# Patient Record
Sex: MALE | Race: WHITE | NOT HISPANIC OR LATINO | Employment: OTHER | URBAN - METROPOLITAN AREA
[De-identification: names, ages, dates, MRNs, and addresses within clinical notes are randomized per-mention and may not be internally consistent; named-entity substitution may affect disease eponyms.]

---

## 2024-02-06 ENCOUNTER — OFFICE VISIT (OUTPATIENT)
Dept: PULMONOLOGY | Facility: CLINIC | Age: 74
End: 2024-02-06
Payer: COMMERCIAL

## 2024-02-06 VITALS
SYSTOLIC BLOOD PRESSURE: 149 MMHG | DIASTOLIC BLOOD PRESSURE: 83 MMHG | HEART RATE: 72 BPM | TEMPERATURE: 97.2 F | RESPIRATION RATE: 16 BRPM | BODY MASS INDEX: 32.23 KG/M2 | OXYGEN SATURATION: 98 % | WEIGHT: 224.6 LBS

## 2024-02-06 DIAGNOSIS — G47.33 OBSTRUCTIVE SLEEP APNEA SYNDROME: ICD-10-CM

## 2024-02-06 DIAGNOSIS — R06.02 SOB (SHORTNESS OF BREATH): Primary | ICD-10-CM

## 2024-02-06 PROBLEM — R73.9 ELEVATED BLOOD SUGAR: Status: ACTIVE | Noted: 2024-02-06

## 2024-02-06 PROBLEM — M75.52 SUBACROMIAL BURSITIS OF LEFT SHOULDER JOINT: Status: ACTIVE | Noted: 2024-02-06

## 2024-02-06 PROBLEM — R53.83 FATIGUE: Status: ACTIVE | Noted: 2024-02-06

## 2024-02-06 PROBLEM — K59.09 CHRONIC CONSTIPATION: Status: ACTIVE | Noted: 2024-02-06

## 2024-02-06 PROBLEM — M25.472 LEFT ANKLE SWELLING: Status: ACTIVE | Noted: 2024-02-06

## 2024-02-06 PROBLEM — C68.9 UROTHELIAL CARCINOMA (MULTI): Status: ACTIVE | Noted: 2024-02-06

## 2024-02-06 PROBLEM — F41.9 ANXIETY DISORDER: Status: ACTIVE | Noted: 2023-06-28

## 2024-02-06 PROBLEM — M10.9 GOUT: Status: ACTIVE | Noted: 2023-06-28

## 2024-02-06 PROBLEM — I10 HYPERTENSIVE DISORDER: Status: ACTIVE | Noted: 2024-02-06

## 2024-02-06 PROBLEM — R07.89 CHEST PAIN, ATYPICAL: Status: ACTIVE | Noted: 2024-02-06

## 2024-02-06 PROBLEM — M1A.09X0 IDIOPATHIC CHRONIC GOUT OF MULTIPLE SITES WITHOUT TOPHUS: Status: ACTIVE | Noted: 2022-10-04

## 2024-02-06 PROBLEM — R63.5 WEIGHT GAIN: Status: ACTIVE | Noted: 2024-02-06

## 2024-02-06 PROBLEM — G43.909 MIGRAINE HEADACHE: Status: ACTIVE | Noted: 2024-02-06

## 2024-02-06 PROBLEM — I10 BENIGN ESSENTIAL HYPERTENSION: Status: ACTIVE | Noted: 2024-02-06

## 2024-02-06 PROBLEM — D69.6 PLATELETS DECREASED (CMS-HCC): Status: ACTIVE | Noted: 2023-12-05

## 2024-02-06 PROBLEM — R74.8 ABNORMAL LIVER ENZYMES: Status: ACTIVE | Noted: 2024-02-06

## 2024-02-06 PROBLEM — G62.9 NEUROPATHY: Status: ACTIVE | Noted: 2024-02-06

## 2024-02-06 PROBLEM — E55.9 VITAMIN D DEFICIENCY, UNSPECIFIED: Status: ACTIVE | Noted: 2024-02-06

## 2024-02-06 PROBLEM — N40.0 BENIGN PROSTATIC HYPERPLASIA: Status: ACTIVE | Noted: 2024-02-06

## 2024-02-06 PROBLEM — E78.5 HYPERLIPIDEMIA: Status: ACTIVE | Noted: 2024-02-06

## 2024-02-06 PROBLEM — H16.223 KERATOCONJUNCTIVITIS SICCA OF BOTH EYES NOT SPECIFIED AS SJOGREN'S: Status: ACTIVE | Noted: 2020-02-03

## 2024-02-06 PROBLEM — N28.9 ABNORMAL KIDNEY FUNCTION: Status: ACTIVE | Noted: 2024-02-06

## 2024-02-06 PROBLEM — S43.82XA: Status: ACTIVE | Noted: 2024-02-06

## 2024-02-06 PROBLEM — I35.0 AORTIC STENOSIS: Status: ACTIVE | Noted: 2024-02-06

## 2024-02-06 PROBLEM — E83.52 HYPERCALCEMIA: Status: ACTIVE | Noted: 2024-02-06

## 2024-02-06 PROBLEM — N40.0 ENLARGED PROSTATE WITHOUT LOWER URINARY TRACT SYMPTOMS (LUTS): Status: ACTIVE | Noted: 2024-02-06

## 2024-02-06 PROBLEM — C64.9: Status: ACTIVE | Noted: 2024-02-06

## 2024-02-06 PROBLEM — M77.52 BURSITIS OF LEFT ANKLE: Status: ACTIVE | Noted: 2024-02-06

## 2024-02-06 PROBLEM — K92.2 LOWER GI BLEEDING: Status: ACTIVE | Noted: 2024-02-06

## 2024-02-06 PROBLEM — E66.9 OBESITY WITH BODY MASS INDEX 30 OR GREATER: Status: ACTIVE | Noted: 2024-02-06

## 2024-02-06 PROBLEM — R41.82 CHANGE IN MENTAL STATUS: Status: ACTIVE | Noted: 2024-02-06

## 2024-02-06 PROBLEM — M99.08 SOMATIC DYSFUNCTION OF RIB CAGE REGION: Status: ACTIVE | Noted: 2024-02-06

## 2024-02-06 PROBLEM — E78.5 DYSLIPIDEMIA: Status: ACTIVE | Noted: 2024-02-06

## 2024-02-06 PROBLEM — R10.10 PAIN OF UPPER ABDOMEN: Status: ACTIVE | Noted: 2024-02-06

## 2024-02-06 PROBLEM — R51.9 CHRONIC HEADACHES: Status: ACTIVE | Noted: 2024-02-06

## 2024-02-06 PROBLEM — G89.29 CHRONIC HEADACHES: Status: ACTIVE | Noted: 2024-02-06

## 2024-02-06 PROBLEM — M47.816 ARTHRITIS OF LUMBAR SPINE: Status: ACTIVE | Noted: 2024-02-06

## 2024-02-06 PROBLEM — K64.9 UNSPECIFIED HEMORRHOIDS: Status: ACTIVE | Noted: 2024-02-06

## 2024-02-06 PROBLEM — K21.9 GASTROESOPHAGEAL REFLUX DISEASE: Status: ACTIVE | Noted: 2024-02-06

## 2024-02-06 PROBLEM — J18.9 PNEUMONIA: Status: ACTIVE | Noted: 2024-02-06

## 2024-02-06 PROBLEM — R10.9 ABDOMINAL PAIN: Status: ACTIVE | Noted: 2024-02-06

## 2024-02-06 PROCEDURE — 1159F MED LIST DOCD IN RCRD: CPT | Performed by: INTERNAL MEDICINE

## 2024-02-06 PROCEDURE — 99205 OFFICE O/P NEW HI 60 MIN: CPT | Performed by: INTERNAL MEDICINE

## 2024-02-06 PROCEDURE — 1126F AMNT PAIN NOTED NONE PRSNT: CPT | Performed by: INTERNAL MEDICINE

## 2024-02-06 PROCEDURE — 3077F SYST BP >= 140 MM HG: CPT | Performed by: INTERNAL MEDICINE

## 2024-02-06 PROCEDURE — 1160F RVW MEDS BY RX/DR IN RCRD: CPT | Performed by: INTERNAL MEDICINE

## 2024-02-06 PROCEDURE — 3079F DIAST BP 80-89 MM HG: CPT | Performed by: INTERNAL MEDICINE

## 2024-02-06 PROCEDURE — 99215 OFFICE O/P EST HI 40 MIN: CPT | Performed by: INTERNAL MEDICINE

## 2024-02-06 RX ORDER — TAMSULOSIN HYDROCHLORIDE 0.4 MG/1
0.4 CAPSULE ORAL DAILY
COMMUNITY

## 2024-02-06 RX ORDER — ALLOPURINOL 300 MG/1
300 TABLET ORAL DAILY
COMMUNITY
Start: 2023-10-18

## 2024-02-06 RX ORDER — ASPIRIN 325 MG
50000 TABLET, DELAYED RELEASE (ENTERIC COATED) ORAL WEEKLY
COMMUNITY
Start: 2023-11-21 | End: 2024-02-19

## 2024-02-06 RX ORDER — ATENOLOL 50 MG/1
50 TABLET ORAL
COMMUNITY
Start: 2022-02-02 | End: 2024-03-04

## 2024-02-06 RX ORDER — LANSOPRAZOLE 30 MG/1
30 CAPSULE, DELAYED RELEASE ORAL
COMMUNITY

## 2024-02-06 RX ORDER — ALLOPURINOL 100 MG/1
100 TABLET ORAL
COMMUNITY
Start: 2023-12-05 | End: 2024-03-04

## 2024-02-06 RX ORDER — ICOSAPENT ETHYL 1 G/1
1 CAPSULE ORAL
COMMUNITY
Start: 2023-07-13

## 2024-02-06 RX ORDER — ALPRAZOLAM 0.5 MG/1
0.5 TABLET ORAL DAILY PRN
COMMUNITY

## 2024-02-06 RX ORDER — NAPROXEN SODIUM 220 MG/1
81 TABLET, FILM COATED ORAL
COMMUNITY
Start: 2023-03-03

## 2024-02-06 RX ORDER — AZITHROMYCIN 250 MG/1
TABLET, FILM COATED ORAL
COMMUNITY
Start: 2023-11-10

## 2024-02-06 RX ORDER — PANTOPRAZOLE SODIUM 40 MG/1
1 TABLET, DELAYED RELEASE ORAL
COMMUNITY
Start: 2023-03-03

## 2024-02-06 RX ORDER — BENZONATATE 200 MG/1
CAPSULE ORAL
COMMUNITY
Start: 2023-11-06

## 2024-02-06 RX ORDER — ASPIRIN 325 MG
50000 TABLET, DELAYED RELEASE (ENTERIC COATED) ORAL
COMMUNITY

## 2024-02-06 RX ORDER — ATORVASTATIN CALCIUM 40 MG/1
40 TABLET, FILM COATED ORAL
COMMUNITY
Start: 2023-12-05 | End: 2024-03-04

## 2024-02-06 RX ORDER — TOPIRAMATE 100 MG/1
2 TABLET, FILM COATED ORAL NIGHTLY
COMMUNITY
Start: 2019-09-06

## 2024-02-06 ASSESSMENT — ENCOUNTER SYMPTOMS
PALPITATIONS: 0
EYE DISCHARGE: 0
SLEEP DISTURBANCE: 0
HEMATURIA: 0
SINUS PRESSURE: 0
HEADACHES: 0
BRUISES/BLEEDS EASILY: 0
ABDOMINAL PAIN: 0
FACIAL SWELLING: 0
TREMORS: 0
WHEEZING: 0
APNEA: 0
ABDOMINAL DISTENTION: 0
UNEXPECTED WEIGHT CHANGE: 0
NUMBNESS: 0
JOINT SWELLING: 0
NAUSEA: 0
DIFFICULTY URINATING: 0
SHORTNESS OF BREATH: 1
EYE REDNESS: 0
RHINORRHEA: 0
SPEECH DIFFICULTY: 0
ARTHRALGIAS: 0
AGITATION: 0
DYSURIA: 0
WEAKNESS: 0
STRIDOR: 0
CHOKING: 0
FEVER: 1
FATIGUE: 0
SINUS PAIN: 0
NERVOUS/ANXIOUS: 0
LIGHT-HEADEDNESS: 0
CONSTIPATION: 0
ADENOPATHY: 0
DIZZINESS: 0
FREQUENCY: 0
COUGH: 0

## 2024-02-06 NOTE — PATIENT INSTRUCTIONS
I will order pulmonary function tests to check your breathing capacity including a 6-minute walk; this can be scheduled by calling     I also obtain a follow-up CT scan of the chest because of the diffuse pneumonia previously noted.  This can be scheduled by calling

## 2024-02-06 NOTE — PROGRESS NOTES
@PULMONARY CONSULTATION@         Reason for Consult: shortness of breath     ASSESSMENT:   The patient is a 73-year-old with ureteral/bladder cancer with hypertension, renal insufficiency, abnormal liver function test, the febrile illness over the last several months without definite COVID who has a long smoking history around 40 years stopping around 10 years ago.  He is complaining of some shortness of breath and had no evidence of a pulmonary embolism on his recent CT scan.  Potentially he has a degree of COPD with his long smoking history.  He has no heart history and further evaluation with pulmonary function tests needs to be performed.  He also should Follow-up with a repeat chest CT because of the diffuse pneumonia noted in November 2023.   Also his weight gain may be contributing somewhat to his shortness of breath.    PLAN:   I will order pulmonary function tests to check your breathing capacity including a 6-minute walk; this can be scheduled by calling     I also obtain a follow-up CT scan of the chest because of the diffuse pneumonia previously noted.  This can be scheduled by calling     In addition, he is following up with urology because of the abnormality noted on the right kidney at the time of the November CT scan.          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  He was said to have recently have had COVID.  He had low-grade fever and fatigue and was treated with a Z-Richie.  He was not treated with Paxlovid.  It should be noted that regarding the febrile illness his wife states that the temperature was quite high around 40 degrees for nearly a month.  He ended up going to various urgent cares and was seen in the  [facility] emergency room on 2023.  The CT scan revealed the followin.  No CT evidence of pulmonary embolism.     2.  Scattered infiltrates, most pronounced in the right upper lobe   suggestive of an infectious/inflammatory process.     3.  Right renal lesion that does not have the typical appearance of a   simple cyst.  Further evaluation with dedicated renal mass protocol CT is   recommended.     He apparently was treated with antibiotics as outlined above.  Since recovering from the febrile illness he has noted some shortness of breath.  He is also having some  pain nonspecifically in chest  and back that comes and goes.  It is not exertional.  He had no evidence of PE on the CT scan.  He states that previously he received some kind of injection and the pain got better.  He smoked probably around 40 years up until age 63.  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  He has no occupational environmental exposures.  He is [place]-speaking and the history was obtained through his wife.  No definite diagnosis of COVID was established as she reports.  Repeat COVID testing was negative.  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, hypertension, abnormal liver function tests with renal insufficiency, gout as     Current Outpatient Medications:     allopurinol (Zyloprim) 100 mg tablet, Take 1 tablet (100 mg) by mouth once daily., Disp: , Rfl:     aspirin 81 mg chewable tablet, Chew 1 tablet (81 mg) once daily., Disp: , Rfl:     atenolol (Tenormin) 50 mg tablet, Take 1 tablet (50 mg) by mouth once daily., Disp: , Rfl:     atorvastatin (Lipitor) 40 mg tablet, Take 1 tablet (40 mg) by mouth once daily., Disp: , Rfl:     cholecalciferol (Vitamin D-3) 50,000 unit capsule, Take 1 capsule (50,000 Units) by mouth once a week., Disp: , Rfl:     hydrocortisone acetate 2.5 % cream with perineal applicator,  Insert into the rectum., Disp: , Rfl:     lansoprazole (Prevacid) 30 mg DR capsule, Take 1 capsule (30 mg) by mouth once daily in the morning. Take before meals. 30 MINUTES BEFORE BREAKFAST, Disp: , Rfl:     tamsulosin (Flomax) 0.4 mg 24 hr capsule, Take 1 capsule (0.4 mg) by mouth once daily., Disp: , Rfl:     topiramate (Topamax) 100 mg tablet, Take 2 tablets (200 mg) by mouth once daily at bedtime., Disp: , Rfl:     allopurinol (Zyloprim) 300 mg tablet, Take 1 tablet (300 mg) by mouth once daily., Disp: , Rfl:     ALPRAZolam (Xanax) 0.5 mg tablet, Take 1 tablet (0.5 mg) by mouth once daily as needed., Disp: , Rfl:     azithromycin (Zithromax) 250 mg tablet, TAKE 2 TABLETS BY MOUTH TODAY, THEN TAKE 1 TABLET DAILY FOR 4 DAYS AS DIRECTED, Disp: , Rfl:     benzonatate (Tessalon) 200 mg capsule, TAKE 1 CAPSULE BY MOUTH 3 TIMES A DAY, FOR 10 DAYS., Disp: , Rfl:     cholecalciferol (Vitamin D-3) 50,000 unit capsule, Take 1 capsule (50,000 Units) by mouth 1 (one) time per week., Disp: , Rfl:     icosapent ethyL (Vascepa) 1 gram capsule, Take 1 capsule (1 g) by mouth once daily., Disp: , Rfl:     pantoprazole (ProtoNix) 40 mg EC tablet, Take 1 tablet (40 mg) by mouth once daily in the morning. Take before meals., Disp: , Rfl:      FAMILY HISTORY:   No COPD, TB, asthma etc  SOCIAL HISTORY:  He smoked around 40 years up until around 10 years ago a pack per day or more.  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    Review of Systems   Constitutional:  Positive for fever. Negative for fatigue and unexpected weight change.        Increase of 10 pounds of late   HENT:  Negative for congestion, facial swelling, nosebleeds, postnasal drip, rhinorrhea, sinus pressure and sinus pain.    Eyes:  Negative for discharge, redness and visual disturbance.   Respiratory:  Positive for shortness of breath. Negative for apnea, cough, choking, wheezing and stridor.    Cardiovascular:   Positive for chest pain. Negative for palpitations and leg swelling.   Gastrointestinal:  Negative for abdominal distention, abdominal pain, constipation and nausea.   Endocrine: Negative for cold intolerance and heat intolerance.   Genitourinary:  Negative for difficulty urinating, dysuria, frequency and hematuria.   Musculoskeletal:  Negative for arthralgias, gait problem and joint swelling.   Allergic/Immunologic: Negative for environmental allergies, food allergies and immunocompromised state.   Neurological:  Negative for dizziness, tremors, syncope, speech difficulty, weakness, light-headedness, numbness and headaches.   Hematological:  Negative for adenopathy. Does not bruise/bleed easily.   Psychiatric/Behavioral:  Negative for agitation, behavioral problems and sleep disturbance. The patient is not nervous/anxious.         Vitals:    02/06/24 1121   BP: 149/83   Pulse: 72   Resp: 16   Temp: 36.2 °C (97.2 °F)   SpO2: 98%        Physical Exam  Vitals reviewed.   Constitutional:       Appearance: Normal appearance.   HENT:      Head: Normocephalic and atraumatic.   Eyes:      Extraocular Movements: Extraocular movements intact.   Cardiovascular:      Rate and Rhythm: Normal rate and regular rhythm.      Heart sounds: No murmur heard.     No friction rub. No gallop.   Pulmonary:      Effort: Pulmonary effort is normal. No respiratory distress.      Breath sounds: Normal breath sounds. No stridor. No wheezing, rhonchi or rales.   Chest:      Chest wall: No tenderness.   Abdominal:      General: Abdomen is flat. There is no distension.      Palpations: Abdomen is soft. There is no mass.      Tenderness: There is no abdominal tenderness.   Musculoskeletal:         General: Normal range of motion.      Cervical back: Normal range of motion.      Right lower leg: No edema.      Left lower leg: No edema.   Skin:     General: Skin is warm and dry.   Neurological:      Mental Status: He is alert and oriented to person,  place, and time.   Psychiatric:         Mood and Affect: Mood normal.         Behavior: Behavior normal.

## 2024-02-06 NOTE — LETTER
Hardik Brennan is a 73 y.o. year old patient referred for pulmonary evaluation.  My summary is attached of his/her current complaints along with their  pertinent past medical history. Also, I have outlined my diagnostic assessment/plan. If you have any questions please feel free to contact me and as always, thank you for the referral.       Oliver Moeller MD, MPH

## 2024-02-23 ENCOUNTER — HOSPITAL ENCOUNTER (OUTPATIENT)
Dept: RADIOLOGY | Facility: HOSPITAL | Age: 74
Discharge: HOME | End: 2024-02-23
Payer: COMMERCIAL

## 2024-02-23 DIAGNOSIS — R06.02 SOB (SHORTNESS OF BREATH): ICD-10-CM

## 2024-02-23 DIAGNOSIS — G47.33 OBSTRUCTIVE SLEEP APNEA SYNDROME: ICD-10-CM

## 2024-02-23 PROCEDURE — 71250 CT THORAX DX C-: CPT | Performed by: STUDENT IN AN ORGANIZED HEALTH CARE EDUCATION/TRAINING PROGRAM

## 2024-02-23 PROCEDURE — 71250 CT THORAX DX C-: CPT

## 2024-03-06 ENCOUNTER — DOCUMENTATION (OUTPATIENT)
Dept: PULMONOLOGY | Facility: HOSPITAL | Age: 74
End: 2024-03-06
Payer: COMMERCIAL

## 2024-03-06 DIAGNOSIS — R91.8 GROUND GLASS OPACITY PRESENT ON IMAGING OF LUNG: Primary | ICD-10-CM

## 2024-03-06 NOTE — PROGRESS NOTES
CT scan reveals scattered nodularity with air trapping and some groundglass changes.  Will obtain a follow-up CT scan in 3 months for the groundglass changes.  Incidental findings are coronary calcifications and some calcifications of the aortic valve.  I spoke with his wife and they will make an appointment to see Dr. Cosme for that.

## 2024-03-08 ENCOUNTER — APPOINTMENT (OUTPATIENT)
Dept: RESPIRATORY THERAPY | Facility: HOSPITAL | Age: 74
End: 2024-03-08
Payer: COMMERCIAL

## 2024-03-08 ENCOUNTER — HOSPITAL ENCOUNTER (OUTPATIENT)
Dept: RESPIRATORY THERAPY | Facility: HOSPITAL | Age: 74
Discharge: HOME | End: 2024-03-08
Payer: COMMERCIAL

## 2024-03-08 DIAGNOSIS — G47.33 OBSTRUCTIVE SLEEP APNEA SYNDROME: ICD-10-CM

## 2024-03-08 DIAGNOSIS — R06.02 SOB (SHORTNESS OF BREATH): ICD-10-CM

## 2024-03-08 PROCEDURE — 94729 DIFFUSING CAPACITY: CPT | Performed by: INTERNAL MEDICINE

## 2024-03-08 PROCEDURE — 94726 PLETHYSMOGRAPHY LUNG VOLUMES: CPT | Performed by: INTERNAL MEDICINE

## 2024-03-08 PROCEDURE — 94618 PULMONARY STRESS TESTING: CPT | Performed by: INTERNAL MEDICINE

## 2024-03-08 PROCEDURE — 94726 PLETHYSMOGRAPHY LUNG VOLUMES: CPT

## 2024-03-08 PROCEDURE — 94060 EVALUATION OF WHEEZING: CPT | Performed by: INTERNAL MEDICINE

## 2024-03-11 ENCOUNTER — DOCUMENTATION (OUTPATIENT)
Dept: PULMONOLOGY | Facility: HOSPITAL | Age: 74
End: 2024-03-11
Payer: COMMERCIAL

## 2024-03-11 NOTE — PROGRESS NOTES
Pulmonary function test revealed only mild airflow obstruction with a mild decrease of saturation on the 6-minute walk.  Will continue to follow.

## 2024-06-06 ENCOUNTER — HOSPITAL ENCOUNTER (OUTPATIENT)
Dept: RADIOLOGY | Facility: HOSPITAL | Age: 74
Discharge: HOME | End: 2024-06-06
Payer: COMMERCIAL

## 2024-06-06 DIAGNOSIS — R91.8 GROUND GLASS OPACITY PRESENT ON IMAGING OF LUNG: ICD-10-CM

## 2024-06-06 PROCEDURE — 71250 CT THORAX DX C-: CPT | Performed by: RADIOLOGY

## 2024-06-06 PROCEDURE — 71250 CT THORAX DX C-: CPT

## 2025-01-24 ENCOUNTER — APPOINTMENT (OUTPATIENT)
Dept: UROLOGY | Facility: CLINIC | Age: 75
End: 2025-01-24
Payer: COMMERCIAL

## 2025-01-24 VITALS
HEIGHT: 73 IN | TEMPERATURE: 97.2 F | BODY MASS INDEX: 29.26 KG/M2 | DIASTOLIC BLOOD PRESSURE: 81 MMHG | HEART RATE: 76 BPM | SYSTOLIC BLOOD PRESSURE: 169 MMHG | WEIGHT: 220.8 LBS

## 2025-01-24 DIAGNOSIS — C67.8 MALIGNANT NEOPLASM OF OVERLAPPING SITES OF BLADDER (MULTI): Primary | ICD-10-CM

## 2025-01-24 DIAGNOSIS — C64.9 TRANSITIONAL CELL CARCINOMA OF KIDNEY, UNSPECIFIED LATERALITY (MULTI): ICD-10-CM

## 2025-01-24 LAB
POC APPEARANCE, URINE: CLEAR
POC BILIRUBIN, URINE: NEGATIVE
POC BLOOD, URINE: NEGATIVE
POC COLOR, URINE: YELLOW
POC GLUCOSE, URINE: NEGATIVE MG/DL
POC KETONES, URINE: NEGATIVE MG/DL
POC LEUKOCYTES, URINE: NEGATIVE
POC NITRITE,URINE: NEGATIVE
POC PH, URINE: 5.5 PH
POC PROTEIN, URINE: ABNORMAL MG/DL
POC SPECIFIC GRAVITY, URINE: 1.02
POC UROBILINOGEN, URINE: 0.2 EU/DL

## 2025-01-24 ASSESSMENT — PAIN SCALES - GENERAL: PAINLEVEL_OUTOF10: 0-NO PAIN

## 2025-01-24 NOTE — PROGRESS NOTES
Patient ID: Hardik Brennan is a 74 y.o. male.  He is here for follow-up.  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  He had a left nephro ureterectomy in October 2022.  He also has superficial bladder cancer.  His last recurrence was in June 2023.  His last cystoscopy was in June 2024, this was normal.      Cystoscopy    Date/Time: 1/24/2025 1:17 PM    Performed by: Tigist Sandoval MD  Authorized by: Tigist Sandoval MD    Procedure - Bladder Cystoscopy:     Procedure details: cystoscopy    Post-procedure:     Patient tolerance: Patient tolerated the procedure well with no immediate complications      Comments:      The patient was placed in a supine position.  His genitalia were prepped and draped.  We introduced viscous lidocaine per urethra.  A cystoscopy was passed per urethra, and we entered the bladder.  The right orifice was identified and appeared normal with clear efflux.  The left orifice is absent.  The bladder mucosa was inspected.  No tumors or stones seen.  Prostate is enlarged, mildly obstructive.  No strictures seen.            Plan:  Schedule cystoscopy w/ right retrograde in 4 months.

## 2025-02-26 ENCOUNTER — TELEPHONE (OUTPATIENT)
Dept: PRIMARY CARE | Facility: CLINIC | Age: 75
End: 2025-02-26
Payer: COMMERCIAL

## 2025-04-21 ENCOUNTER — PRE-ADMISSION TESTING (OUTPATIENT)
Dept: PREADMISSION TESTING | Facility: HOSPITAL | Age: 75
End: 2025-04-21
Payer: COMMERCIAL

## 2025-04-21 DIAGNOSIS — Z01.818 PREOP TESTING: Primary | ICD-10-CM

## 2025-04-21 DIAGNOSIS — C64.9 TRANSITIONAL CELL CARCINOMA OF KIDNEY, UNSPECIFIED LATERALITY: ICD-10-CM

## 2025-04-21 PROCEDURE — 99204 OFFICE O/P NEW MOD 45 MIN: CPT | Performed by: NURSE PRACTITIONER

## 2025-04-21 PROCEDURE — 93005 ELECTROCARDIOGRAM TRACING: CPT

## 2025-04-21 PROCEDURE — 93010 ELECTROCARDIOGRAM REPORT: CPT | Performed by: INTERNAL MEDICINE

## 2025-04-21 RX ORDER — LINAGLIPTIN 5 MG/1
5 TABLET, FILM COATED ORAL DAILY
COMMUNITY

## 2025-04-21 RX ORDER — BUDESONIDE AND FORMOTEROL FUMARATE DIHYDRATE 160; 4.5 UG/1; UG/1
2 AEROSOL RESPIRATORY (INHALATION)
COMMUNITY

## 2025-04-21 ASSESSMENT — DUKE ACTIVITY SCORE INDEX (DASI)
CAN YOU WALK A BLOCK OR TWO ON LEVEL GROUND: YES
CAN YOU DO HEAVY WORK AROUND THE HOUSE LIKE SCRUBBING FLOORS OR LIFTING AND MOVING HEAVY FURNITURE: NO
CAN YOU TAKE CARE OF YOURSELF (EAT, DRESS, BATHE, OR USE TOILET): YES
DASI METS SCORE: 6.3
TOTAL_SCORE: 28.7
CAN YOU PARTICIPATE IN STRENOUS SPORTS LIKE SWIMMING, SINGLES TENNIS, FOOTBALL, BASKETBALL, OR SKIING: NO
CAN YOU PARTICIPATE IN MODERATE RECREATIONAL ACTIVITIES LIKE GOLF, BOWLING, DANCING, DOUBLES TENNIS OR THROWING A BASEBALL OR FOOTBALL: NO
CAN YOU DO LIGHT WORK AROUND THE HOUSE LIKE DUSTING OR WASHING DISHES: YES
CAN YOU DO MODERATE WORK AROUND THE HOUSE LIKE VACUUMING, SWEEPING FLOORS OR CARRYING GROCERIES: YES
CAN YOU CLIMB A FLIGHT OF STAIRS OR WALK UP A HILL: YES
CAN YOU RUN A SHORT DISTANCE: NO
CAN YOU WALK INDOORS, SUCH AS AROUND YOUR HOUSE: YES
CAN YOU HAVE SEXUAL RELATIONS: YES
CAN YOU DO YARD WORK LIKE RAKING LEAVES, WEEDING OR PUSHING A MOWER: YES

## 2025-04-21 NOTE — CPM/PAT H&P
[facility]/PAT Evaluation       Name: Hardik Brennan (Hardik Brennan)  /Age: 1950/74 y.o.     In-Person       Chief Complaint: Malignant neoplasm of overlapping sites of bladder (Multi)     HPI  Patient is an alert and oriented 74 year old male scheduled for a  CYSTOSCOPY, WITH RETROGRADE PYELOGRAM on 25 with Dr. Sandoval for  Malignant neoplasm of overlapping sites of bladder (Multi). PMHX includes HTN, CKD4, bladder cancer, COPD. Presents to [facility] PAT today for perioperative risk stratification and optimization.     Medical History[1]    Surgical History[2]    Patient  has no history on file for sexual activity.    Family History[3]    Allergies[4]    Prior to Admission medications    Medication Sig Start Date End Date Taking? Authorizing Provider   allopurinol (Zyloprim) 300 mg tablet Take 1 tablet (300 mg) by mouth once daily. 10/18/23   Historical Provider, MD   ALPRAZolam (Xanax) 0.5 mg tablet Take 1 tablet (0.5 mg) by mouth once daily as needed.    Historical Provider, MD   aspirin 81 mg chewable tablet Chew 1 tablet (81 mg) once daily. 3/3/23   Historical Provider, MD   atenolol (Tenormin) 50 mg tablet Take 1 tablet (50 mg) by mouth once daily. 2/2/22 3/4/24  Historical Provider, MD   atorvastatin (Lipitor) 40 mg tablet Take 1 tablet (40 mg) by mouth once daily. 12/5/23 3/4/24  Historical Provider, MD   azithromycin (Zithromax) 250 mg tablet TAKE 2 TABLETS BY MOUTH TODAY, THEN TAKE 1 TABLET DAILY FOR 4 DAYS AS DIRECTED 11/10/23   Historical Provider, MD   benzonatate (Tessalon) 200 mg capsule TAKE 1 CAPSULE BY MOUTH 3 TIMES A DAY, FOR 10 DAYS. 23   Historical Provider, MD   cholecalciferol (Vitamin D-3) 50,000 unit capsule Take 1 capsule (50,000 Units) by mouth 1 (one) time per week.    Historical Provider, MD   hydrocortisone acetate 2.5 % cream with perineal applicator Insert into the rectum. 3/4/20   Historical Provider, MD   icosapent ethyL (Vascepa) 1 gram capsule Take 1 capsule (1 g) by mouth  once daily. 7/13/23   Historical Provider, MD   lansoprazole (Prevacid) 30 mg DR capsule Take 1 capsule (30 mg) by mouth once daily in the morning. Take before meals. 30 MINUTES BEFORE BREAKFAST    Historical Provider, MD   pantoprazole (ProtoNix) 40 mg EC tablet Take 1 tablet (40 mg) by mouth once daily in the morning. Take before meals. 3/3/23   Historical Provider, MD   tamsulosin (Flomax) 0.4 mg 24 hr capsule Take 1 capsule (0.4 mg) by mouth once daily.    Historical Provider, MD   topiramate (Topamax) 100 mg tablet Take 2 tablets (200 mg) by mouth once daily at bedtime. 9/6/19   Historical Provider, MD      Review of Systems   Constitutional: Negative for chills, decreased appetite, diaphoresis, fever and malaise/fatigue.   Eyes:  Negative for blurred vision and double vision.   Cardiovascular:  Negative for chest pain, claudication, cyanosis, dyspnea on exertion, irregular heartbeat, leg swelling, near-syncope and palpitations.   Respiratory:  Negative for cough, hemoptysis, shortness of breath and wheezing.    Endocrine: Negative for cold intolerance, heat intolerance, polydipsia, polyphagia and polyuria.   Gastrointestinal:  Negative for abdominal pain, constipation, diarrhea, dysphagia, nausea and vomiting.   Genitourinary:  Negative for bladder incontinence, dysuria, hematuria, incomplete emptying, nocturia, frequency, pelvic pain and urgency.   Neurological:  Negative for headaches, light-headedness, paresthesias, sensory change and weakness.   Psychiatric/Behavioral:  Negative for altered mental status.    Musculoskeletal: Negative for myalgias, arthralgias     Vitals and nursing note reviewed.     Physical exam  Constitutional:       Appearance: Normal appearance. He is Overweight.   HENT:      Head: Normocephalic and atraumatic.      Mouth/Throat:      Mouth: Mucous membranes are moist.      Pharynx: Oropharynx is clear.   Eyes:      Extraocular Movements: Extraocular movements intact.       Conjunctiva/sclera: Conjunctivae normal.      Pupils: Pupils are equal, round, and reactive to light.   Cardiovascular:      PMI at left midclavicular line. Normal rate. Regular rhythm. Normal S1. Normal S2.       Murmurs: There is no murmur.      No gallop.  No click. No rub.       No audible carotid bruit     No lower extremity edema on exam  Pulmonary:      Effort: Pulmonary effort is normal.      Breath sounds: Normal breath sounds.   Abdominal:      General: Abdomen is flat. Bowel sounds are normal.      Palpations: Abdomen is soft and non-tender  Musculoskeletal:      Cervical back: Normal range of motion and neck supple.   Skin:     General: Skin is warm and dry.      Capillary Refill: Capillary refill takes less than 2 seconds.   Neurological:      General: No focal deficit present.      Mental Status: He is alert and oriented to person, place, and time. Mental status is at baseline.   Psychiatric:         Mood and Affect: Mood normal.         Behavior: Behavior normal.         Thought Content: Thought content normal.         Judgment: Judgment normal.     Vitals and nursing note reviewed. Physical exam within normal limits.     DASI Risk Score      Flowsheet Row Pre-Admission Testing from 4/21/2025 in [facility]   Can you take care of yourself (eat, dress, bathe, or use toilet)?  2.75 filed at 04/21/2025 1627   Can you walk indoors, such as around your house? 1.75 filed at 04/21/2025 1627   Can you walk a block or two on level ground?  2.75 filed at 04/21/2025 1627   Can you climb a flight of stairs or walk up a hill? 5.5 filed at 04/21/2025 1627   Can you run a short distance? 0 filed at 04/21/2025 1627   Can you do light work around the house like dusting or washing dishes? 2.7 filed at 04/21/2025 1627   Can you do moderate work around the house like vacuuming, sweeping floors or carrying groceries? 3.5 filed at 04/21/2025 1627   Can you do heavy work around the house like scrubbing  floors or lifting and moving heavy furniture?  0 filed at 04/21/2025 1627   Can you do yard work like raking leaves, weeding or pushing a mower? 4.5 filed at 04/21/2025 1627   Can you have sexual relations? 5.25 filed at 04/21/2025 1627   Can you participate in moderate recreational activities like golf, bowling, dancing, doubles tennis or throwing a baseball or football? 0 filed at 04/21/2025 1627   Can you participate in strenous sports like swimming, singles tennis, football, basketball, or skiing? 0 filed at 04/21/2025 1627   DASI SCORE 28.7 filed at 04/21/2025 1627   METS Score (Will be calculated only when all the questions are answered) 6.3 filed at 04/21/2025 1627          Caprini DVT Assessment      Flowsheet Row Pre-Admission Testing from 4/21/2025 in [facility]   DVT Score (IF A SCORE IS NOT CALCULATING, MUST SELECT A BMI TO COMPLETE) 7 filed at 04/21/2025 1626   Medical Factors Present cancer, chemotherapy, or previous malignancy filed at 04/21/2025 1626   Surgical Factors Major surgery planned, including arthroscopic and laproscopic (1-2 hours) filed at 04/21/2025 1626   BMI (BMI MUST BE CHOSEN) 30 or less filed at 04/21/2025 1626          Modified Frailty Index    No data to display       [id number] Stroke Risk Points  Current as of just now        N/A 0 to 9 Points:      Last Change: N/A          The [id number] risk score (Lip GH, et al. 2009. © 2010 [employer]) quantifies the risk of stroke for a patient with atrial fibrillation. For patients without atrial fibrillation or under the age of 18 this score appears as N/A. Higher score values generally indicate higher risk of stroke.        This score is not applicable to this patient. Components are not calculated.          Revised Cardiac Risk Index      Flowsheet Row Pre-Admission Testing from 4/21/2025 in [facility]   High-Risk Surgery (Intraperitoneal, Intrathoracic,Suprainguinal  vascular) 0 filed at 04/21/2025 1627   History of ischemic heart disease (History of MI, History of positive exercuse test, Current chest paint considered due to myocardial ischemia, Use of nitrate therapy, ECG with pathological Q Waves) 0 filed at 04/21/2025 1627   History of congestive heart failure (pulmonary edemia, bilateral rales or S3 gallop, Paroxysmal nocturnal dyspnea, CXR showing pulmonary vascular redistribution) 0 filed at 04/21/2025 1627   History of cerebrovascular disease (Prior TIA or stroke) 0 filed at 04/21/2025 1627   Pre-operative insulin treatment 0 filed at 04/21/2025 1627   Pre-operative creatinine>2 mg/dl 0 filed at 04/21/2025 1627   Revised Cardiac Risk Calculator 0 filed at 04/21/2025 1627          Apfel Simplified Score    No data to display       Risk Analysis Index Results This Encounter    No data found in the last 10 encounters.       Stop Bang Score      Flowsheet Row Pre-Admission Testing from 4/21/2025 in [facility]   Do you snore loudly? 1 filed at 04/21/2025 1428   Do you often feel tired or fatigued after your sleep? 1 filed at 04/21/2025 1428   Has anyone ever observed you stop breathing in your sleep? 0 filed at 04/21/2025 1428   Do you have or are you being treated for high blood pressure? 1 filed at 04/21/2025 1428   Recent BMI (Calculated) 29.1 filed at 04/21/2025 1428   Is BMI greater than 35 kg/m2? 0=No filed at 04/21/2025 1428   Age older than 50 years old? 1=Yes filed at 04/21/2025 1428   Is your neck circumference greater than 17 inches (Male) or 16 inches (Female)? 1 filed at 04/21/2025 1428   Gender - Male 1=Yes filed at 04/21/2025 1428   STOP-BANG Total Score 6 filed at 04/21/2025 1428          Prodigy: High Risk  Total Score: 20              Prodigy Age Score      Prodigy Gender Score          ARISCAT Score for Postoperative Pulmonary Complications      Flowsheet Row Pre-Admission Testing from 4/21/2025 in [facility]   Age  Calculated Score 3 filed at 04/21/2025 1630   Preoperative SpO2 0 filed at 04/21/2025 1630   Respiratory infection in the last month Either upper or lower (i.e., URI, bronchitis, pneumonia), with fever and antibiotic treatment 0 filed at 04/21/2025 1630   Preoperative anemia (Hgb less than 10 g/dl) 0 filed at 04/21/2025 1630   Surgical incision  0 filed at 04/21/2025 1630   Duration of surgery  0 filed at 04/21/2025 1630   Emergency Procedure  0 filed at 04/21/2025 1630   ARISCAT Total Score  3 filed at 04/21/2025 1630          Malaika Perioperative Risk for Myocardial Infarction or Cardiac Arrest (CHRISTIAN)      Flowsheet Row Pre-Admission Testing from 4/21/2025 in [facility]   Calculated Age Score 1.46 filed at 04/21/2025 1627   Functional Status  0 filed at 04/21/2025 1627   ASA Class  -1.92 filed at 04/21/2025 1627   Creatinine 0.61 filed at 04/21/2025 1627   Type of Procedure  -0.26 filed at 04/21/2025 1627   CHRISTIAN Total Score  -5.36 filed at 04/21/2025 1627   CHRISTIAN % 0.47 filed at 04/21/2025 1627              Assessment & Plan:    Neuro:  Anxiety (alprazolam)     HEENT/Airway:  No diagnosis or significant findings on chart review or clinical presentation and evaluation.   STOP-BANG Score- 6 points high risk for JUSTINE    Mallampati::  III    TM distance::  >3 FB    Neck ROM::  Full  Dentures-reports upper and lower   Crowns-denies  Implants-denies    Cardiovascular:  HTN, HLD (atenolol, atorvastatin, asa, )  METS: 6.3  RCRI: 0 points, 3.9%  risk for postoperative MACE   CHRISTIAN: 0.47% risk for postoperative MACE  EKG -normal sinus rhythm Rate- No acute changes.     Pulmonary:  Mild obstruction (Symbicort)    ARISCAT: <26 points, 1.6% risk of in-hospital postoperative pulmonary complication  PRODIGY: Moderate risk for opioid induced respiratory depression  Smoking History- he quit smoking cigarettes 15 years ago   Discussed smoking cessation and deep breathing handout given    Renal/Urinary:    CDK stage VI  hx of a nephrectomy in 2022 follows with nephrology   Bladder cancer (icosapent ethyl)   the patient is at increased risk of perioperative renal complications secondary to renal insuff (preop creat >1.2 mg/dl). Preventative measures include BP monitoring, medication compliance, and hydration management.   CMP  Creatinine-2.2  GFR-31    Endocrine:  DM2 (tradjenta)     Hematologic/Immunology:  No diagnosis or significant findings on chart review or clinical presentation and evaluation.  The patient is not a Jehovah’s witness and will accept blood and blood products if medically indicated.   History of previous blood transfusions No  CBC  HGB-16.9/49.6  PLTS 141 - history of low PLTS  Caprini Score 7, patient at Moderate for postoperative DVT. Pt supplied education/VTE handout  Anticoagulation use: Yes     Gastrointestinal:   GERD (pantoprazole)   Recreational drug use: none  Alcohol use none    Infectious disease:   No diagnosis or significant findings on chart review or clinical presentation and evaluation.     Musculoskeletal:   No diagnosis or significant findings on chart review or clinical presentation and evaluation.   JHFRAT score- 5 points. low risk for falls    Anesthesia:  ASA 3 - Patient with moderate systemic disease with functional limitations  Anticipated anesthesia- MAC  History of General anesthesia- yes  Complications- No anesthesia complications  No family history of anesthesia complications    Labs & Imaging ordered:  EKG  Nickel/metal allergy-negative  Shellfish allergy-negative     Discussed with patient medication instructions, NPO guidelines, and any questions or concerns.      time spent 45 minutes          [1]   Past Medical History:  Diagnosis Date    Chest pain, unspecified 12/07/2013    Chest pain    Gout, unspecified     Gouty arthritis    Hepatomegaly, not elsewhere classified     Hepatomegaly    Malignant neoplasm of prostate (Multi)     Prostate cancer    Other bursitis of knee, left  knee 06/12/2015    Other bursitis of left knee    Other muscle spasm 04/23/2014    Muscle spasm    Personal history of other diseases of the digestive system 08/04/2014    History of hemorrhoids    Personal history of other diseases of urinary system 07/24/2015    History of simple renal cyst   [2]   Past Surgical History:  Procedure Laterality Date    COLONOSCOPY  03/28/2014    Colonoscopy (Fiberoptic)    ESOPHAGOGASTRODUODENOSCOPY  03/28/2014    Diagnostic Esophagogastroduodenoscopy   [3] No family history on file.  [4] No Known Allergies

## 2025-04-21 NOTE — PREPROCEDURE INSTRUCTIONS
Medication List            Accurate as of April 21, 2025  2:42 PM. Always use your most recent med list.                allopurinol 300 mg tablet  Commonly known as: Zyloprim  Medication Adjustments for Surgery: Take/Use as prescribed     ALPRAZolam 0.5 mg tablet  Commonly known as: Xanax  Medication Adjustments for Surgery: Take/Use as prescribed     aspirin 81 mg chewable tablet  Additional Medication Adjustments for Surgery: Other (Comment)  Notes to patient: last dose preoperatively on 4/27/25     atenolol 50 mg tablet  Commonly known as: Tenormin  Medication Adjustments for Surgery: Take/Use as prescribed     atorvastatin 40 mg tablet  Commonly known as: Lipitor  Medication Adjustments for Surgery: Take/Use as prescribed     azithromycin 250 mg tablet  Commonly known as: Zithromax  Medication Adjustments for Surgery: Take/Use as prescribed     benzonatate 200 mg capsule  Commonly known as: Tessalon  Medication Adjustments for Surgery: Take/Use as prescribed     cholecalciferol 1.25 mg (50,000 units) capsule  Commonly known as: Vitamin D-3  Additional Medication Adjustments for Surgery: Take last dose 7 days before surgery  Notes to patient: last dose preoperatively on 4/27/25     hydrocortisone acetate 2.5 % cream with perineal applicator  Medication Adjustments for Surgery: Do Not take on the morning of surgery     icosapent ethyL 1 gram capsule  Commonly known as: Vascepa  Medication Adjustments for Surgery: Take/Use as prescribed     lansoprazole 30 mg DR capsule  Commonly known as: Prevacid  Medication Adjustments for Surgery: Take/Use as prescribed     pantoprazole 40 mg EC tablet  Commonly known as: ProtoNix  Medication Adjustments for Surgery: Take/Use as prescribed     Symbicort 160-4.5 mcg/actuation inhaler  Generic drug: budesonide-formoterol  Medication Adjustments for Surgery: Take/Use as prescribed     tamsulosin 0.4 mg 24 hr capsule  Commonly known as: Flomax  Medication Adjustments for  Surgery: Take/Use as prescribed     topiramate 100 mg tablet  Commonly known as: Topamax  Medication Adjustments for Surgery: Take/Use as prescribed     Tradjenta 5 mg tablet  Generic drug: linaGLIPtin  Medication Adjustments for Surgery: Take last dose 1 day (24 hours) before surgery  Notes to patient: Do not take this medication the day before or the day of surgery             NPO Instructions:     Do not eat any food after midnight the night before your surgery/procedure.  You may have clear liquids until TWO hours before surgery/procedure. This includes water, black tea/coffee, (no milk or cream) apple juice and electrolyte drinks (Gatorade).  You may chew gum up to TWO hours before your surgery/procedure.     Additional Instructions:      Seven/Six Days before Surgery:  Review your medication instructions, stop indicated medications  Five Days before Surgery:  Review your medication instructions, stop indicated medications  Three Days before Surgery:  Review your medication instructions, stop indicated medications  The Day before Surgery:  No smoking or alcohol use 24 hours before surgery  Review your medication instructions, stop indicated medications  You will be contacted regarding the time of your arrival to facility and surgery time  Do not eat any food after Midnight  Day of Surgery:  Review your medication instructions, take indicated medications  If you have diabetes, please check your fasting blood sugar upon awakening.  If fasting blood sugar is <80 mg/dl, drink 100 ml of apple juice, time limit of 2 hours before  You may have clear liquids until TWO hours before surgery/procedure.  This includes water, black tea/coffee, (no milk or cream) apple juice and electrolyte drinks (Gatorade)  You may chew gum up to TWO hours before your surgery/procedure  Wear  comfortable loose fitting clothing  Do not use moisturizers, creams, lotions or perfume  All jewelry and valuables should be left at home     CONTACT  SURGEON'S OFFICE IF YOU DEVELOP:  * Fever = 100.4 F   * New respiratory symptoms (e.g. cough, shortness of breath, respiratory distress, sore throat)  * Recent loss of taste or smell  *Flu like symptoms such as headache, fatigue or gastrointestinal symptoms  * You develop any open sores, shingles, burning or painful urination   AND/OR:  * You no longer wish to have the surgery.  * Any other personal circumstances change that may lead to the need to cancel or defer this surgery.  *You were admitted to any hospital within one week of your planned procedure.     SMOKING:  *Quitting smoking can make a huge difference to your health and recovery from surgery.    *If you need help with quitting, call 9-751-QUIT-NOW.     THE DAY BEFORE SURGERY:  *Do not eat any food after midnight the night before your surgery.   *You may have up to TEN OUNCES of clear liquids until TWO hours before your instructed ARRIVAL TIME to hospital. This includes water, black tea/coffee, (no milk or cream) apple juice, clear broth and electrolyte drinks (Gatorade). Please avoid clear liquids that are red in color.   *You may chew gum/mints up to TWO hours before your surgery/procedure.     SURGICAL TIME:  *You will be contacted between 2 p.m. and 3 p.m. the business day before your surgery with your arrival time.  *If you haven't received a call by 3pm, call [phone]  *Scheduled surgery times may change and you will be notified if this occurs-check your personal voicemail for any updates.     ON THE MORNING OF SURGERY:  *Wear comfortable, loose fitting clothing.   *Do not use moisturizers, creams, lotions or perfume.  *All jewelry and valuables should be left at home.  *Prosthetic devices such as contact lenses, hearing aids, dentures, eyelash extensions, hairpins and body piercing must be removed before surgery.     BRING WITH YOU:  *Photo ID and insurance card  *Current list of medications and allergies  *Pacemaker/Defibrillator/Heart stent  cards  *CPAP machine and mask  *Slings/splints/crutches  *Copy of your complete Advanced Directive/DHPOA-if applicable  *Neurostimulator implant remote     PARKING AND ARRIVAL:  *Check in at the Main Entrance desk and let them know you are here for surgery.     IF YOU ARE HAVING OUTPATIENT/SAME DAY SURGERY:  *A responsible adult MUST accompany you at the time of discharge and stay with you for 24 hours after your surgery.  *You may NOT drive yourself home after surgery.  *You may use a taxi or ride sharing service ([employer], Uber) to return home ONLY if you are accompanied by a friend or family member.  *Instructions for resuming your medications will be provided by your surgeon.     Thank you for coming to Pre Admission testing.      If I have prescribed medication please don't forget to  at your pharmacy.      Any questions about today's visit call [phone] and leave a message in the general mailbox.     Patient instructed to ambulate as soon as possible postoperatively to decrease thromboembolic risk.      [facility] you [facility].  If you have any changes to your health condition, please call the surgeons office to alert them and give them details of your symptoms.        Preoperative Fasting Guidelines     Why must I stop eating and drinking near surgery time?  With sedation, food or liquid in your stomach can enter your lungs causing serious complications  Increases nausea and vomiting     When do I need to stop eating and drinking before my surgery?  Do not eat any food after midnight the night before your surgery/procedure.  You may have up to TEN ounces of clear liquid until TWO hours before your instructed arrival time to the hospital.  This includes water, black tea/coffee, (no milk or cream) apple juice, and electrolyte drinks (Gatorade)  You may chew gum until TWO hours before your surgery/procedure        Additional Instructions:      The Day before  Surgery:  -Review your medication instructions, stop indicated medications  -You will be contacted in the evening regarding the time of your arrival to facility and surgery time     Day of Surgery:  -Review your medication instructions, take indicated medications  -Wear comfortable loose fitting clothing  -Do not use moisturizers, creams, lotions or perfume  -All jewelry and valuables should be left at home                   Preoperative Brain Exercises     What are brain exercises?  A brain exercise is any activity that engages your thinking (cognitive) skills.     What types of activities are considered brain exercises?  Jigsaw puzzles, crossword puzzles, word jumble, memory games, word search, and many more.  Many can be found free online or on your phone via a mobile lucy.     Why should I do brain exercises before my surgery?  More recent research has shown brain exercise before surgery can lower the risk of postoperative delirium (confusion) which can be especially important for older adults.  Patients who did brain exercises for 5 to 10 minutes/day in the days before surgery, cut their risk of postoperative delirium in half up to 1 week after surgery.                         [facility]     Preoperative Deep Breathing Exercises     Why it is important to do deep breathing exercises before my surgery?  Deep breathing exercises strengthen your breathing muscles.  This helps you to recover after your surgery and decreases the chance of breathing complications.        How are the deep breathing exercises done?  Sit straight with your back supported.  Breathe in deeply and slowly through your nose. Your lower rib cage should expand and your abdomen may move forward.  Hold that breath for 3 to 5 seconds.  Breathe out through pursed lips, slowly and completely.  Rest and repeat 10 times every hour while awake.  Rest longer if you become dizzy or lightheaded.                      [facility]     Preoperative Deep Breathing Exercises     Why it is important to do deep breathing exercises before my surgery?  Deep breathing exercises strengthen your breathing muscles.  This helps you to recover after your surgery and decreases the chance of breathing complications.        How are the deep breathing exercises done?  Sit straight with your back supported.  Breathe in deeply and slowly through your nose. Your lower rib cage should expand and your abdomen may move forward.  Hold that breath for 3 to 5 seconds.  Breathe out through pursed lips, slowly and completely.  Rest and repeat 10 times every hour while awake.  Rest longer if you become dizzy or lightheaded.        Patient Information: Incentive Spirometer  What is an incentive spirometer?  An incentive spirometer is a device used before and after surgery to “exercise” your lungs.  It helps you to take deeper breaths to expand your lungs.  Below is an example of a basic incentive spirometer.  The device you receive may differ slightly but they all function the same.    Why do I need to use an incentive spirometer?  Using your incentive spirometer prepares your lungs for surgery and helps prevent lung problems after surgery.  How do I use my incentive spirometer?  When you're using your incentive spirometer, make sure to breathe through your mouth. If you breathe through your nose, the incentive spirometer won't work properly. You can hold your nose if you have trouble.  If you feel dizzy at any time, stop and rest. Try again at a later time.  Follow the steps below:  Set up your incentive spirometer, expand the flexible tubing and connect to the outlet.  Sit upright in a chair or bed. Hold the incentive spirometer at eye level.   Put the mouthpiece in your mouth and close your lips tightly around it. Slowly breathe out (exhale) completely.  Breathe in (inhale) slowly through your mouth as deeply as you can. As you take a breath,  you will see the piston rise inside the large column. While the piston rises, the indicator should move upwards. It should stay in between the 2 arrows (see Figure).  Try to get the piston as high as you can, while keeping the indicator between the arrows.   If the indicator doesn't stay between the arrows, you're breathing either too fast or too slow.  When you get it as high as you can, hold your breath for 10 seconds, or as long as possible. While you're holding your breath, the piston will slowly fall to the base of the spirometer.  Once the piston reaches the bottom of the spirometer, breathe out slowly through your mouth. Rest for a few seconds.  Repeat 10 times. Try to get the piston to the same level with each breath.  Repeat every hour while awake  You can carefully clean the outside of the mouthpiece with an alcohol wipe or soap and water.       Patient and Family Education             Ways You Can Help Prevent Blood Clots                    This handout explains some simple things you can do to help prevent blood clots.      Blood clots are blockages that can form in the body's veins. When a blood clot forms in your deep veins, it may be called a deep vein thrombosis, or DVT for short. Blood clots can happen in any part of the body where blood flows, but they are most common in the arms and legs. If a piece of a blood clot breaks free and travels to the lungs, it is called a pulmonary embolus (PE). A PE can be a very serious problem.         Being in the hospital or having surgery can raise your chances of getting a blood clot because you may not be well enough to move around as much as you normally do.         Ways you can help prevent blood clots in the hospital           Wearing SCDs. SCDs stands for Sequential Compression Devices.   SCDs are special sleeves that wrap around your legs  They attach to a pump that fills them with air to gently squeeze your legs every few minutes.   This helps return the  blood in your legs to your heart.   SCDs should only be taken off when walking or bathing.   SCDs may not be comfortable, but they can help save your life.                                            Wearing compression stockings - if your doctor orders them. These special snug fitting stockings gently squeeze your legs to help blood flow.       Walking. Walking helps move the blood in your legs.   If your doctor says it is ok, try walking the halls at least   5 times a day. Ask us to help you get up, so you don't fall.      Taking any blood thinning medicines your doctor orders.        Page 1 of 2            [facility]; 3/23   Ways you can help prevent blood clots at home         Wearing compression stockings - if your doctor orders them. ? Walking - to help move the blood in your legs.       Taking any blood thinning medicines your doctor orders.      Signs of a blood clot or PE        Tell your doctor or nurse know right away if you have of the problems listed below.    If you are at home, seek medical care right away. Call 911 for chest pain or problems breathing.                Signs of a blood clot (DVT) - such as pain,  swelling, redness or warmth in your arm or leg      Signs of a pulmonary embolism (PE) - such as chest pain or feeling short of breath

## 2025-04-21 NOTE — H&P (VIEW-ONLY)
[facility]/PAT Evaluation       Name: Hardik Brennan (Hardik Brennan)  /Age: 1950/74 y.o.     In-Person       Chief Complaint: Malignant neoplasm of overlapping sites of bladder (Multi)     HPI  Patient is an alert and oriented 74 year old male scheduled for a  CYSTOSCOPY, WITH RETROGRADE PYELOGRAM on 25 with Dr. Sandoval for  Malignant neoplasm of overlapping sites of bladder (Multi). PMHX includes HTN, CKD4, bladder cancer, COPD. Presents to [facility] PAT today for perioperative risk stratification and optimization.     Medical History[1]    Surgical History[2]    Patient  has no history on file for sexual activity.    Family History[3]    Allergies[4]    Prior to Admission medications    Medication Sig Start Date End Date Taking? Authorizing Provider   allopurinol (Zyloprim) 300 mg tablet Take 1 tablet (300 mg) by mouth once daily. 10/18/23   Historical Provider, MD   ALPRAZolam (Xanax) 0.5 mg tablet Take 1 tablet (0.5 mg) by mouth once daily as needed.    Historical Provider, MD   aspirin 81 mg chewable tablet Chew 1 tablet (81 mg) once daily. 3/3/23   Historical Provider, MD   atenolol (Tenormin) 50 mg tablet Take 1 tablet (50 mg) by mouth once daily. 2/2/22 3/4/24  Historical Provider, MD   atorvastatin (Lipitor) 40 mg tablet Take 1 tablet (40 mg) by mouth once daily. 12/5/23 3/4/24  Historical Provider, MD   azithromycin (Zithromax) 250 mg tablet TAKE 2 TABLETS BY MOUTH TODAY, THEN TAKE 1 TABLET DAILY FOR 4 DAYS AS DIRECTED 11/10/23   Historical Provider, MD   benzonatate (Tessalon) 200 mg capsule TAKE 1 CAPSULE BY MOUTH 3 TIMES A DAY, FOR 10 DAYS. 23   Historical Provider, MD   cholecalciferol (Vitamin D-3) 50,000 unit capsule Take 1 capsule (50,000 Units) by mouth 1 (one) time per week.    Historical Provider, MD   hydrocortisone acetate 2.5 % cream with perineal applicator Insert into the rectum. 3/4/20   Historical Provider, MD   icosapent ethyL (Vascepa) 1 gram capsule Take 1 capsule (1 g) by mouth  once daily. 7/13/23   Historical Provider, MD   lansoprazole (Prevacid) 30 mg DR capsule Take 1 capsule (30 mg) by mouth once daily in the morning. Take before meals. 30 MINUTES BEFORE BREAKFAST    Historical Provider, MD   pantoprazole (ProtoNix) 40 mg EC tablet Take 1 tablet (40 mg) by mouth once daily in the morning. Take before meals. 3/3/23   Historical Provider, MD   tamsulosin (Flomax) 0.4 mg 24 hr capsule Take 1 capsule (0.4 mg) by mouth once daily.    Historical Provider, MD   topiramate (Topamax) 100 mg tablet Take 2 tablets (200 mg) by mouth once daily at bedtime. 9/6/19   Historical Provider, MD      Review of Systems   Constitutional: Negative for chills, decreased appetite, diaphoresis, fever and malaise/fatigue.   Eyes:  Negative for blurred vision and double vision.   Cardiovascular:  Negative for chest pain, claudication, cyanosis, dyspnea on exertion, irregular heartbeat, leg swelling, near-syncope and palpitations.   Respiratory:  Negative for cough, hemoptysis, shortness of breath and wheezing.    Endocrine: Negative for cold intolerance, heat intolerance, polydipsia, polyphagia and polyuria.   Gastrointestinal:  Negative for abdominal pain, constipation, diarrhea, dysphagia, nausea and vomiting.   Genitourinary:  Negative for bladder incontinence, dysuria, hematuria, incomplete emptying, nocturia, frequency, pelvic pain and urgency.   Neurological:  Negative for headaches, light-headedness, paresthesias, sensory change and weakness.   Psychiatric/Behavioral:  Negative for altered mental status.    Musculoskeletal: Negative for myalgias, arthralgias     Vitals and nursing note reviewed.     Physical exam  Constitutional:       Appearance: Normal appearance. He is Overweight.   HENT:      Head: Normocephalic and atraumatic.      Mouth/Throat:      Mouth: Mucous membranes are moist.      Pharynx: Oropharynx is clear.   Eyes:      Extraocular Movements: Extraocular movements intact.       Conjunctiva/sclera: Conjunctivae normal.      Pupils: Pupils are equal, round, and reactive to light.   Cardiovascular:      PMI at left midclavicular line. Normal rate. Regular rhythm. Normal S1. Normal S2.       Murmurs: There is no murmur.      No gallop.  No click. No rub.       No audible carotid bruit     No lower extremity edema on exam  Pulmonary:      Effort: Pulmonary effort is normal.      Breath sounds: Normal breath sounds.   Abdominal:      General: Abdomen is flat. Bowel sounds are normal.      Palpations: Abdomen is soft and non-tender  Musculoskeletal:      Cervical back: Normal range of motion and neck supple.   Skin:     General: Skin is warm and dry.      Capillary Refill: Capillary refill takes less than 2 seconds.   Neurological:      General: No focal deficit present.      Mental Status: He is alert and oriented to person, place, and time. Mental status is at baseline.   Psychiatric:         Mood and Affect: Mood normal.         Behavior: Behavior normal.         Thought Content: Thought content normal.         Judgment: Judgment normal.     Vitals and nursing note reviewed. Physical exam within normal limits.     DASI Risk Score      Flowsheet Row Pre-Admission Testing from 4/21/2025 in [facility]   Can you take care of yourself (eat, dress, bathe, or use toilet)?  2.75 filed at 04/21/2025 1627   Can you walk indoors, such as around your house? 1.75 filed at 04/21/2025 1627   Can you walk a block or two on level ground?  2.75 filed at 04/21/2025 1627   Can you climb a flight of stairs or walk up a hill? 5.5 filed at 04/21/2025 1627   Can you run a short distance? 0 filed at 04/21/2025 1627   Can you do light work around the house like dusting or washing dishes? 2.7 filed at 04/21/2025 1627   Can you do moderate work around the house like vacuuming, sweeping floors or carrying groceries? 3.5 filed at 04/21/2025 1627   Can you do heavy work around the house like scrubbing  floors or lifting and moving heavy furniture?  0 filed at 04/21/2025 1627   Can you do yard work like raking leaves, weeding or pushing a mower? 4.5 filed at 04/21/2025 1627   Can you have sexual relations? 5.25 filed at 04/21/2025 1627   Can you participate in moderate recreational activities like golf, bowling, dancing, doubles tennis or throwing a baseball or football? 0 filed at 04/21/2025 1627   Can you participate in strenous sports like swimming, singles tennis, football, basketball, or skiing? 0 filed at 04/21/2025 1627   DASI SCORE 28.7 filed at 04/21/2025 1627   METS Score (Will be calculated only when all the questions are answered) 6.3 filed at 04/21/2025 1627          Caprini DVT Assessment      Flowsheet Row Pre-Admission Testing from 4/21/2025 in [facility]   DVT Score (IF A SCORE IS NOT CALCULATING, MUST SELECT A BMI TO COMPLETE) 7 filed at 04/21/2025 1626   Medical Factors Present cancer, chemotherapy, or previous malignancy filed at 04/21/2025 1626   Surgical Factors Major surgery planned, including arthroscopic and laproscopic (1-2 hours) filed at 04/21/2025 1626   BMI (BMI MUST BE CHOSEN) 30 or less filed at 04/21/2025 1626          Modified Frailty Index    No data to display       [id number] Stroke Risk Points  Current as of just now        N/A 0 to 9 Points:      Last Change: N/A          The [id number]-[id number] risk score (Lip GH, et al. 2009. © 2010 American College [employer]) quantifies the risk of stroke for a patient with atrial fibrillation. For patients without atrial fibrillation or under the age of 18 this score appears as N/A. Higher score values generally indicate higher risk of stroke.        This score is not applicable to this patient. Components are not calculated.          Revised Cardiac Risk Index      Flowsheet Row Pre-Admission Testing from 4/21/2025 in [facility]   High-Risk Surgery (Intraperitoneal, Intrathoracic,Suprainguinal  vascular) 0 filed at 04/21/2025 1627   History of ischemic heart disease (History of MI, History of positive exercuse test, Current chest paint considered due to myocardial ischemia, Use of nitrate therapy, ECG with pathological Q Waves) 0 filed at 04/21/2025 1627   History of congestive heart failure (pulmonary edemia, bilateral rales or S3 gallop, Paroxysmal nocturnal dyspnea, CXR showing pulmonary vascular redistribution) 0 filed at 04/21/2025 1627   History of cerebrovascular disease (Prior TIA or stroke) 0 filed at 04/21/2025 1627   Pre-operative insulin treatment 0 filed at 04/21/2025 1627   Pre-operative creatinine>2 mg/dl 0 filed at 04/21/2025 1627   Revised Cardiac Risk Calculator 0 filed at 04/21/2025 1627          Apfel Simplified Score    No data to display       Risk Analysis Index Results This Encounter    No data found in the last 10 encounters.       Stop Bang Score      Flowsheet Row Pre-Admission Testing from 4/21/2025 in [facility]   Do you snore loudly? 1 filed at 04/21/2025 1428   Do you often feel tired or fatigued after your sleep? 1 filed at 04/21/2025 1428   Has anyone ever observed you stop breathing in your sleep? 0 filed at 04/21/2025 1428   Do you have or are you being treated for high blood pressure? 1 filed at 04/21/2025 1428   Recent BMI (Calculated) 29.1 filed at 04/21/2025 1428   Is BMI greater than 35 kg/m2? 0=No filed at 04/21/2025 1428   Age older than 50 years old? 1=Yes filed at 04/21/2025 1428   Is your neck circumference greater than 17 inches (Male) or 16 inches (Female)? 1 filed at 04/21/2025 1428   Gender - Male 1=Yes filed at 04/21/2025 1428   STOP-BANG Total Score 6 filed at 04/21/2025 1428          Prodigy: High Risk  Total Score: 20              Prodigy Age Score      Prodigy Gender Score          ARISCAT Score for Postoperative Pulmonary Complications      Flowsheet Row Pre-Admission Testing from 4/21/2025 in [facility]   Age  Calculated Score 3 filed at 04/21/2025 1630   Preoperative SpO2 0 filed at 04/21/2025 1630   Respiratory infection in the last month Either upper or lower (i.e., URI, bronchitis, pneumonia), with fever and antibiotic treatment 0 filed at 04/21/2025 1630   Preoperative anemia (Hgb less than 10 g/dl) 0 filed at 04/21/2025 1630   Surgical incision  0 filed at 04/21/2025 1630   Duration of surgery  0 filed at 04/21/2025 1630   Emergency Procedure  0 filed at 04/21/2025 1630   ARISCAT Total Score  3 filed at 04/21/2025 1630          Malaika Perioperative Risk for Myocardial Infarction or Cardiac Arrest (CHRISTIAN)      Flowsheet Row Pre-Admission Testing from 4/21/2025 in [facility]   Calculated Age Score 1.46 filed at 04/21/2025 1627   Functional Status  0 filed at 04/21/2025 1627   ASA Class  -1.92 filed at 04/21/2025 1627   Creatinine 0.61 filed at 04/21/2025 1627   Type of Procedure  -0.26 filed at 04/21/2025 1627   CHRISTIAN Total Score  -5.36 filed at 04/21/2025 1627   CHRISTIAN % 0.47 filed at 04/21/2025 1627              Assessment & Plan:    Neuro:  Anxiety (alprazolam)     HEENT/Airway:  No diagnosis or significant findings on chart review or clinical presentation and evaluation.   STOP-BANG Score- 6 points high risk for JUSTINE    Mallampati::  III    TM distance::  >3 FB    Neck ROM::  Full  Dentures-reports upper and lower   Crowns-denies  Implants-denies    Cardiovascular:  HTN, HLD (atenolol, atorvastatin, asa, )  METS: 6.3  RCRI: 0 points, 3.9%  risk for postoperative MACE   CHRISTIAN: 0.47% risk for postoperative MACE  EKG -normal sinus rhythm Rate- No acute changes.     Pulmonary:  Mild obstruction (Symbicort)    ARISCAT: <26 points, 1.6% risk of in-hospital postoperative pulmonary complication  PRODIGY: Moderate risk for opioid induced respiratory depression  Smoking History- he quit smoking cigarettes 15 years ago   Discussed smoking cessation and deep breathing handout given    Renal/Urinary:    CDK stage VI  hx of a nephrectomy in 2022 follows with nephrology   Bladder cancer (icosapent ethyl)   the patient is at increased risk of perioperative renal complications secondary to renal insuff (preop creat >1.2 mg/dl). Preventative measures include BP monitoring, medication compliance, and hydration management.   CMP  Creatinine-2.2  GFR-31    Endocrine:  DM2 (tradjenta)     Hematologic/Immunology:  No diagnosis or significant findings on chart review or clinical presentation and evaluation.  The patient is not a Jehovah’s witness and will accept blood and blood products if medically indicated.   History of previous blood transfusions No  CBC  HGB-16.9/49.6  PLTS 141 - history of low PLTS  Caprini Score 7, patient at Moderate for postoperative DVT. Pt supplied education/VTE handout  Anticoagulation use: Yes     Gastrointestinal:   GERD (pantoprazole)   Recreational drug use: none  Alcohol use none    Infectious disease:   No diagnosis or significant findings on chart review or clinical presentation and evaluation.     Musculoskeletal:   No diagnosis or significant findings on chart review or clinical presentation and evaluation.   JHFRAT score- 5 points. low risk for falls    Anesthesia:  ASA 3 - Patient with moderate systemic disease with functional limitations  Anticipated anesthesia- MAC  History of General anesthesia- yes  Complications- No anesthesia complications  No family history of anesthesia complications    Labs & Imaging ordered:  EKG  Nickel/metal allergy-negative  Shellfish allergy-negative     Discussed with patient medication instructions, NPO guidelines, and any questions or concerns.      time spent 45 minutes          [1]   Past Medical History:  Diagnosis Date    Chest pain, unspecified 12/07/2013    Chest pain    Gout, unspecified     Gouty arthritis    Hepatomegaly, not elsewhere classified     Hepatomegaly    Malignant neoplasm of prostate (Multi)     Prostate cancer    Other bursitis of knee, left  knee 06/12/2015    Other bursitis of left knee    Other muscle spasm 04/23/2014    Muscle spasm    Personal history of other diseases of the digestive system 08/04/2014    History of hemorrhoids    Personal history of other diseases of urinary system 07/24/2015    History of simple renal cyst   [2]   Past Surgical History:  Procedure Laterality Date    COLONOSCOPY  03/28/2014    Colonoscopy (Fiberoptic)    ESOPHAGOGASTRODUODENOSCOPY  03/28/2014    Diagnostic Esophagogastroduodenoscopy   [3] No family history on file.  [4] No Known Allergies

## 2025-04-23 LAB
ATRIAL RATE: 60 BPM
P AXIS: 56 DEGREES
P OFFSET: 182 MS
P ONSET: 124 MS
PR INTERVAL: 184 MS
Q ONSET: 216 MS
QRS COUNT: 10 BEATS
QRS DURATION: 94 MS
QT INTERVAL: 390 MS
QTC CALCULATION(BAZETT): 390 MS
QTC FREDERICIA: 390 MS
R AXIS: 60 DEGREES
T AXIS: 45 DEGREES
T OFFSET: 411 MS
VENTRICULAR RATE: 60 BPM

## 2025-05-05 ENCOUNTER — APPOINTMENT (OUTPATIENT)
Dept: RADIOLOGY | Facility: HOSPITAL | Age: 75
End: 2025-05-05
Payer: COMMERCIAL

## 2025-05-05 ENCOUNTER — HOSPITAL ENCOUNTER (OUTPATIENT)
Facility: HOSPITAL | Age: 75
Setting detail: OUTPATIENT SURGERY
Discharge: HOME | End: 2025-05-05
Attending: SURGERY | Admitting: SURGERY
Payer: COMMERCIAL

## 2025-05-05 ENCOUNTER — ANESTHESIA EVENT (OUTPATIENT)
Dept: OPERATING ROOM | Facility: HOSPITAL | Age: 75
End: 2025-05-05
Payer: COMMERCIAL

## 2025-05-05 ENCOUNTER — ANESTHESIA (OUTPATIENT)
Dept: OPERATING ROOM | Facility: HOSPITAL | Age: 75
End: 2025-05-05
Payer: COMMERCIAL

## 2025-05-05 VITALS
BODY MASS INDEX: 28.9 KG/M2 | WEIGHT: 218.03 LBS | SYSTOLIC BLOOD PRESSURE: 138 MMHG | TEMPERATURE: 97.7 F | HEART RATE: 70 BPM | DIASTOLIC BLOOD PRESSURE: 73 MMHG | HEIGHT: 73 IN | OXYGEN SATURATION: 96 % | RESPIRATION RATE: 16 BRPM

## 2025-05-05 DIAGNOSIS — C67.8 MALIGNANT NEOPLASM OF OVERLAPPING SITES OF BLADDER (MULTI): Primary | ICD-10-CM

## 2025-05-05 LAB — GLUCOSE BLD MANUAL STRIP-MCNC: 141 MG/DL (ref 74–99)

## 2025-05-05 PROCEDURE — 3600000008 HC OR TIME - EACH INCREMENTAL 1 MINUTE - PROCEDURE LEVEL THREE: Performed by: SURGERY

## 2025-05-05 PROCEDURE — 2500000005 HC RX 250 GENERAL PHARMACY W/O HCPCS: Performed by: SURGERY

## 2025-05-05 PROCEDURE — 2550000001 HC RX 255 CONTRASTS: Performed by: SURGERY

## 2025-05-05 PROCEDURE — 82947 ASSAY GLUCOSE BLOOD QUANT: CPT

## 2025-05-05 PROCEDURE — 2500000004 HC RX 250 GENERAL PHARMACY W/ HCPCS (ALT 636 FOR OP/ED): Performed by: SURGERY

## 2025-05-05 PROCEDURE — 7100000002 HC RECOVERY ROOM TIME - EACH INCREMENTAL 1 MINUTE: Performed by: SURGERY

## 2025-05-05 PROCEDURE — 2500000004 HC RX 250 GENERAL PHARMACY W/ HCPCS (ALT 636 FOR OP/ED)

## 2025-05-05 PROCEDURE — 52005 CYSTO W/URTRL CATHJ: CPT | Performed by: SURGERY

## 2025-05-05 PROCEDURE — 3700000002 HC GENERAL ANESTHESIA TIME - EACH INCREMENTAL 1 MINUTE: Performed by: SURGERY

## 2025-05-05 PROCEDURE — 74420 UROGRAPHY RTRGR +-KUB: CPT | Performed by: SURGERY

## 2025-05-05 PROCEDURE — 3600000003 HC OR TIME - INITIAL BASE CHARGE - PROCEDURE LEVEL THREE: Performed by: SURGERY

## 2025-05-05 PROCEDURE — 3700000001 HC GENERAL ANESTHESIA TIME - INITIAL BASE CHARGE: Performed by: SURGERY

## 2025-05-05 PROCEDURE — 7100000001 HC RECOVERY ROOM TIME - INITIAL BASE CHARGE: Performed by: SURGERY

## 2025-05-05 PROCEDURE — 7100000009 HC PHASE TWO TIME - INITIAL BASE CHARGE: Performed by: SURGERY

## 2025-05-05 PROCEDURE — 7100000010 HC PHASE TWO TIME - EACH INCREMENTAL 1 MINUTE: Performed by: SURGERY

## 2025-05-05 PROCEDURE — C1769 GUIDE WIRE: HCPCS | Performed by: SURGERY

## 2025-05-05 PROCEDURE — 2720000007 HC OR 272 NO HCPCS: Performed by: SURGERY

## 2025-05-05 PROCEDURE — 74420 UROGRAPHY RTRGR +-KUB: CPT

## 2025-05-05 RX ORDER — CEFAZOLIN SODIUM 2 G/100ML
2 INJECTION, SOLUTION INTRAVENOUS ONCE
Status: COMPLETED | OUTPATIENT
Start: 2025-05-05 | End: 2025-05-05

## 2025-05-05 RX ORDER — ONDANSETRON HYDROCHLORIDE 2 MG/ML
INJECTION, SOLUTION INTRAVENOUS AS NEEDED
Status: DISCONTINUED | OUTPATIENT
Start: 2025-05-05 | End: 2025-05-05

## 2025-05-05 RX ORDER — FENTANYL CITRATE 50 UG/ML
50 INJECTION, SOLUTION INTRAMUSCULAR; INTRAVENOUS EVERY 5 MIN PRN
Status: DISCONTINUED | OUTPATIENT
Start: 2025-05-05 | End: 2025-05-05 | Stop reason: HOSPADM

## 2025-05-05 RX ORDER — ALBUTEROL SULFATE 0.83 MG/ML
2.5 SOLUTION RESPIRATORY (INHALATION) ONCE AS NEEDED
Status: DISCONTINUED | OUTPATIENT
Start: 2025-05-05 | End: 2025-05-05 | Stop reason: HOSPADM

## 2025-05-05 RX ORDER — MEPERIDINE HYDROCHLORIDE 25 MG/ML
12.5 INJECTION INTRAMUSCULAR; INTRAVENOUS; SUBCUTANEOUS EVERY 10 MIN PRN
Status: DISCONTINUED | OUTPATIENT
Start: 2025-05-05 | End: 2025-05-05 | Stop reason: HOSPADM

## 2025-05-05 RX ORDER — LIDOCAINE HYDROCHLORIDE 20 MG/ML
JELLY TOPICAL AS NEEDED
Status: DISCONTINUED | OUTPATIENT
Start: 2025-05-05 | End: 2025-05-05 | Stop reason: HOSPADM

## 2025-05-05 RX ORDER — SODIUM CHLORIDE, SODIUM LACTATE, POTASSIUM CHLORIDE, CALCIUM CHLORIDE 600; 310; 30; 20 MG/100ML; MG/100ML; MG/100ML; MG/100ML
100 INJECTION, SOLUTION INTRAVENOUS CONTINUOUS
Status: DISCONTINUED | OUTPATIENT
Start: 2025-05-05 | End: 2025-05-05 | Stop reason: HOSPADM

## 2025-05-05 RX ORDER — PROPOFOL 10 MG/ML
INJECTION, EMULSION INTRAVENOUS AS NEEDED
Status: DISCONTINUED | OUTPATIENT
Start: 2025-05-05 | End: 2025-05-05

## 2025-05-05 RX ORDER — LABETALOL HYDROCHLORIDE 5 MG/ML
5 INJECTION, SOLUTION INTRAVENOUS ONCE AS NEEDED
Status: DISCONTINUED | OUTPATIENT
Start: 2025-05-05 | End: 2025-05-05 | Stop reason: HOSPADM

## 2025-05-05 RX ORDER — HYDRALAZINE HYDROCHLORIDE 20 MG/ML
5 INJECTION INTRAMUSCULAR; INTRAVENOUS EVERY 30 MIN PRN
Status: DISCONTINUED | OUTPATIENT
Start: 2025-05-05 | End: 2025-05-05 | Stop reason: HOSPADM

## 2025-05-05 RX ORDER — ONDANSETRON HYDROCHLORIDE 2 MG/ML
4 INJECTION, SOLUTION INTRAVENOUS ONCE AS NEEDED
Status: DISCONTINUED | OUTPATIENT
Start: 2025-05-05 | End: 2025-05-05 | Stop reason: HOSPADM

## 2025-05-05 RX ORDER — SODIUM CHLORIDE, SODIUM LACTATE, POTASSIUM CHLORIDE, CALCIUM CHLORIDE 600; 310; 30; 20 MG/100ML; MG/100ML; MG/100ML; MG/100ML
50 INJECTION, SOLUTION INTRAVENOUS CONTINUOUS
Status: DISCONTINUED | OUTPATIENT
Start: 2025-05-05 | End: 2025-05-05 | Stop reason: HOSPADM

## 2025-05-05 RX ORDER — FENTANYL CITRATE 50 UG/ML
INJECTION, SOLUTION INTRAMUSCULAR; INTRAVENOUS AS NEEDED
Status: DISCONTINUED | OUTPATIENT
Start: 2025-05-05 | End: 2025-05-05

## 2025-05-05 RX ORDER — LIDOCAINE HYDROCHLORIDE 10 MG/ML
INJECTION, SOLUTION EPIDURAL; INFILTRATION; INTRACAUDAL; PERINEURAL AS NEEDED
Status: DISCONTINUED | OUTPATIENT
Start: 2025-05-05 | End: 2025-05-05

## 2025-05-05 RX ADMIN — PROPOFOL 30 MG: 10 INJECTION, EMULSION INTRAVENOUS at 12:06

## 2025-05-05 RX ADMIN — CEFAZOLIN SODIUM 2 G: 2 INJECTION, SOLUTION INTRAVENOUS at 11:55

## 2025-05-05 RX ADMIN — PROPOFOL 40 MG: 10 INJECTION, EMULSION INTRAVENOUS at 12:03

## 2025-05-05 RX ADMIN — LIDOCAINE HYDROCHLORIDE 5 ML: 10 INJECTION, SOLUTION EPIDURAL; INFILTRATION; INTRACAUDAL; PERINEURAL at 11:59

## 2025-05-05 RX ADMIN — FENTANYL CITRATE 25 MCG: 0.05 INJECTION, SOLUTION INTRAMUSCULAR; INTRAVENOUS at 11:58

## 2025-05-05 RX ADMIN — PROPOFOL 40 MG: 10 INJECTION, EMULSION INTRAVENOUS at 11:59

## 2025-05-05 RX ADMIN — ONDANSETRON 4 MG: 2 INJECTION, SOLUTION INTRAMUSCULAR; INTRAVENOUS at 12:10

## 2025-05-05 RX ADMIN — SODIUM CHLORIDE, POTASSIUM CHLORIDE, SODIUM LACTATE AND CALCIUM CHLORIDE: 600; 310; 30; 20 INJECTION, SOLUTION INTRAVENOUS at 11:55

## 2025-05-05 SDOH — HEALTH STABILITY: MENTAL HEALTH: CURRENT SMOKER: 0

## 2025-05-05 ASSESSMENT — PAIN SCALES - GENERAL
PAIN_LEVEL: 0
PAINLEVEL_OUTOF10: 0 - NO PAIN

## 2025-05-05 ASSESSMENT — COLUMBIA-SUICIDE SEVERITY RATING SCALE - C-SSRS
1. IN THE PAST MONTH, HAVE YOU WISHED YOU WERE DEAD OR WISHED YOU COULD GO TO SLEEP AND NOT WAKE UP?: NO
2. HAVE YOU ACTUALLY HAD ANY THOUGHTS OF KILLING YOURSELF?: NO
6. HAVE YOU EVER DONE ANYTHING, STARTED TO DO ANYTHING, OR PREPARED TO DO ANYTHING TO END YOUR LIFE?: NO

## 2025-05-05 ASSESSMENT — PAIN - FUNCTIONAL ASSESSMENT
PAIN_FUNCTIONAL_ASSESSMENT: 0-10

## 2025-05-05 NOTE — PERIOPERATIVE NURSING NOTE
Patient in Phase 2; dressed and up to chair with RN assist. Tolerating po fluids, no complaint of pain and no complaint of nausea.     Significant other at bedside; discussed discharge instructions with patient and Significant other. All questions at this time answered and family verbalizes understanding of discharge instructions. P)  Patient Chinese speaking wife understands and interpreted instructions to patient.  Patient was able to void but not measured.    Patient clinically appropriate for discharge. IV removed and patient transported to discharge area via wheelchair.

## 2025-05-05 NOTE — OP NOTE
CYSTOSCOPY, WITH RETROGRADE PYELOGRAM (R) Operative Note     Date: 2025  OR Location: [facility]    Name: Hardik Brennan, : 1950, Age: 74 y.o., MRN: [id number], Sex: male    Diagnosis  Pre-op Diagnosis      * Malignant neoplasm of overlapping sites of bladder (Multi) [C67.8] Post-op Diagnosis     * Malignant neoplasm of overlapping sites of bladder (Multi) [C67.8]     Procedures  CYSTOSCOPY, WITH RETROGRADE PYELOGRAM  87808 - ND CYSTOURETHROSCOPY W/URETERAL CATHETERIZATION      Surgeons      * Tigist Sandoval - Primary    Resident/Fellow/Other Assistant:  Surgeons and Role:  * No surgeons found with a matching role *    Staff:   Danayulator: Tiffanie Mcdowell Person: Bobbi    Anesthesia Staff: Anesthesiologist: Oliver Easton MD  CRNA: ALMA Rice-NORMA    Procedure Summary  Anesthesia: General  ASA: III  Estimated Blood Loss: 0 mL  Intra-op Medications:   Administrations occurring from 1150 to 1245 on 25:   Medication Name Total Dose   lidocaine 2 % mucosal jelly (Uro-Jet) 1 Application   iohexol (OMNIPaque) 240 mg iodine/mL solution 10 mL   fentaNYL (Sublimaze) injection 50 mcg/mL 25 mcg   lactated Ringer's infusion Cannot be calculated   lidocaine PF (Xylocaine-MPF) local injection 1 % 5 mL   ondansetron (Zofran) 2 mg/mL injection 4 mg   propofol (Diprivan) injection 10 mg/mL 110 mg   ceFAZolin (Ancef) 2 g in dextrose (iso)  mL 2 g              Anesthesia Record               Intraprocedure I/O Totals       None           Specimen: No specimens collected              Drains and/or Catheters: * None in log *    Tourniquet Times:         Implants:     Findings: 1. Normal bladder mucosa.                   2. Normal right upper tract.       Indications: Hardik Brennan is an 74 y.o. male who is having surgery for Malignant neoplasm of overlapping sites of bladder (Multi) [C67.8].     The patient was seen in the preoperative area. The risks, benefits, complications, treatment options,  non-operative alternatives, expected recovery and outcomes were discussed with the patient. The possibilities of reaction to medication, pulmonary aspiration, injury to surrounding structures, bleeding, recurrent infection, the need for additional procedures, failure to diagnose a condition, and creating a complication requiring transfusion or operation were discussed with the patient. The patient concurred with the proposed plan, giving informed consent.  The site of surgery was properly noted/marked if necessary per policy. The patient has been actively warmed in preoperative area. Preoperative antibiotics have been ordered and given within 1 hours of incision. Venous thrombosis prophylaxis have been ordered including bilateral sequential compression devices    Procedure Details: The patient was placed in a lithotomy position.  His genitalia were prepped and draped.  We introduced viscous lidocaine per urethra.  A cystoscope was passed per urethra, and we entered the bladder.  The right orifice was identified and appeared normal with clear efflux.  The left orifice is absent.  The bladder mucosa was inspected.  No tumors or stones seen.  Prostate is enlarged, mildly obstructive.  No strictures seen.  We next introduced a ureteral catheter.  We injected contrast and performed a right retrograde pyelogram.  The right renal collecting system was opacified and appeared normal.  There were no filling defects seen.  At this point we removed our scope.  The patient was awakened and brought to the recovery room in stable condition.  Evidence of Infection: No   Complications:  None; patient tolerated the procedure well.    Disposition: PACU - hemodynamically stable.  Condition: stable                 Additional Details:     Attending Attestation: I was present and scrubbed for the entire procedure.    Tigist Jaime  Phone Number: [phone]

## 2025-05-05 NOTE — ANESTHESIA PREPROCEDURE EVALUATION
Patient: Hardik Brennan    Procedure Information       Date/Time: 05/05/25 1150    Procedure: CYSTOSCOPY, WITH RETROGRADE PYELOGRAM (Right)    Location: [facility] 01 / Virtual [facility]    Surgeons: Tigist Sandoval MD            Relevant Problems   Cardiac   (+) Aortic stenosis   (+) Benign essential hypertension   (+) Chest pain, atypical   (+) Hyperlipidemia   (+) Hypertensive disorder      Pulmonary   (+) Pneumonia      Neuro   (+) Anxiety disorder   (+) Chronic headaches      GI   (+) Gastroesophageal reflux disease   (+) Lower GI bleeding      /Renal   (+) Benign prostatic hyperplasia   (+) Transitional cell carcinoma of kidney      ID   (+) Pneumonia       Clinical information reviewed:                   NPO Detail:  No data recorded     Physical Exam    Airway  Mallampati: II  TM distance: >3 FB  Neck ROM: full  Mouth opening: 3 or more finger widths     Cardiovascular Comments: deferred   Dental    Pulmonary Comments: deferred   Abdominal   Comments: deferred           Anesthesia Plan    History of general anesthesia?: yes  History of complications of general anesthesia?: no    ASA 3     MAC     The patient is not a current smoker.  Education provided regarding risk of obstructive sleep apnea.  intravenous induction   Postoperative pain plan includes opioids.  Anesthetic plan and risks discussed with patient.    Plan discussed with CRNA.

## 2025-05-05 NOTE — ANESTHESIA POSTPROCEDURE EVALUATION
Patient: Hardik Brennan    Procedure Summary       Date: 05/05/25 Room / Location: [facility] 01 / [facility]    Anesthesia Start: 1155 Anesthesia Stop: 1218    Procedure: CYSTOSCOPY, WITH RETROGRADE PYELOGRAM (Right) Diagnosis:       Malignant neoplasm of overlapping sites of bladder (Multi)      (Malignant neoplasm of overlapping sites of bladder (Multi) [C67.8])    Surgeons: Tigist Sandoval MD Responsible Provider: Oliver Easton MD    Anesthesia Type: general ASA Status: 3            Anesthesia Type: general    Vitals Value Taken Time   /76 05/05/25 12:37   Temp 36.3 °C (97.3 °F) 05/05/25 12:37   Pulse 63 05/05/25 12:37   Resp 16 05/05/25 12:37   SpO2 95 % 05/05/25 12:37       Anesthesia Post Evaluation    Patient location during evaluation: PACU  Patient participation: complete - patient participated  Level of consciousness: awake and alert  Pain score: 0  Pain management: adequate  Multimodal analgesia pain management approach  Airway patency: patent  Two or more strategies used to mitigate risk of obstructive sleep apnea  Cardiovascular status: acceptable and blood pressure returned to baseline  Respiratory status: acceptable  Hydration status: acceptable  Postoperative Nausea and Vomiting: none        There were no known notable events for this encounter.

## 2025-05-06 ASSESSMENT — PAIN SCALES - GENERAL: PAINLEVEL_OUTOF10: 3

## (undated) DEVICE — GUIDEWIRE, SOLO FLEX, HYDRO, 0.038 X 150CM, ANGLED

## (undated) DEVICE — GOWNS, SURGICAL, NONREINFORCED, W/ RAGLAN SLEEVE, SZ XL

## (undated) DEVICE — PREP TRAY, SKIN

## (undated) DEVICE — Device

## (undated) DEVICE — CATHETER, URETERAL, POLLACK, OPEN END, 5.5 FR, 70 CM

## (undated) DEVICE — BLANKET, LOWER BODY, VHA PLUS, ADULT

## (undated) DEVICE — GLOVE, SURGICAL, PROTEXIS,  7.5, PF, LATEX

## (undated) DEVICE — SOLUTION, IRRIGATION, X RX SODIUM CHL 0.9%, 1000ML BTL

## (undated) DEVICE — WATER STERILE, FOR IRRIGATION, FLEXIBLE, 3000ML, W/HANGER